# Patient Record
Sex: MALE | Race: OTHER | NOT HISPANIC OR LATINO | URBAN - METROPOLITAN AREA
[De-identification: names, ages, dates, MRNs, and addresses within clinical notes are randomized per-mention and may not be internally consistent; named-entity substitution may affect disease eponyms.]

---
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
## 2024-08-20 ENCOUNTER — APPOINTMENT (OUTPATIENT)
Dept: ULTRASOUND IMAGING | Facility: CLINIC | Age: 19
End: 2024-08-20
Payer: COMMERCIAL

## 2024-08-20 ENCOUNTER — TRANSCRIPTION ENCOUNTER (OUTPATIENT)
Age: 19
End: 2024-08-20

## 2024-08-20 PROCEDURE — 76700 US EXAM ABDOM COMPLETE: CPT

## 2024-10-07 NOTE — ED PEDIATRIC NURSE NOTE - CHIEF COMPLAINT
Patient notified and the message was conveyed. The patient will pick the orders up from the .  
The patient will be making an appt for next week. He would like HIV and HEP C screenings as well.  
The patient is a 11y Male complaining of head injury.

## 2025-06-15 NOTE — PHYSICAL EXAM
I attest my time as attending is greater than 50% of the total combined time spent on qualifying patient care activities by the PA/NP and attending. [Clean] : clean [Dry] : dry I attest my time as attending is greater than 50% of the total combined time spent on qualifying patient care activities by the PA/NP and attending. [Intact] : intact [Normal Respiratory Efforts] : normal respiratory efforts [NL] : soft, not tender, not distended [Erythema] : no erythema [Drainage] : no drainage